# Patient Record
Sex: MALE | Race: WHITE | NOT HISPANIC OR LATINO
[De-identification: names, ages, dates, MRNs, and addresses within clinical notes are randomized per-mention and may not be internally consistent; named-entity substitution may affect disease eponyms.]

---

## 2022-06-14 ENCOUNTER — APPOINTMENT (OUTPATIENT)
Dept: ORTHOPEDIC SURGERY | Facility: CLINIC | Age: 57
End: 2022-06-14
Payer: COMMERCIAL

## 2022-06-14 ENCOUNTER — RESULT CHARGE (OUTPATIENT)
Age: 57
End: 2022-06-14

## 2022-06-14 PROCEDURE — 99204 OFFICE O/P NEW MOD 45 MIN: CPT | Mod: 25

## 2022-06-14 PROCEDURE — 73562 X-RAY EXAM OF KNEE 3: CPT | Mod: RT

## 2022-06-14 PROCEDURE — 73100 X-RAY EXAM OF WRIST: CPT | Mod: LT

## 2022-06-14 PROCEDURE — 20600 DRAIN/INJ JOINT/BURSA W/O US: CPT | Mod: LT

## 2022-06-17 ENCOUNTER — APPOINTMENT (OUTPATIENT)
Dept: ORTHOPEDIC SURGERY | Facility: CLINIC | Age: 57
End: 2022-06-17
Payer: COMMERCIAL

## 2022-06-17 PROCEDURE — 73110 X-RAY EXAM OF WRIST: CPT | Mod: RT

## 2022-06-17 PROCEDURE — 99213 OFFICE O/P EST LOW 20 MIN: CPT

## 2022-06-17 NOTE — PHYSICAL EXAM
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

## 2022-06-17 NOTE — HISTORY OF PRESENT ILLNESS
[de-identified] : 57 year old male presents with RIGHT wrist pain for the past 6 months which has worsened over the past 2 months. No injury/trauma.

## 2022-06-20 ENCOUNTER — NON-APPOINTMENT (OUTPATIENT)
Age: 57
End: 2022-06-20

## 2022-06-20 DIAGNOSIS — M23.41 LOOSE BODY IN KNEE, RIGHT KNEE: ICD-10-CM

## 2022-06-20 DIAGNOSIS — M25.531 PAIN IN RIGHT WRIST: ICD-10-CM

## 2022-06-20 DIAGNOSIS — S83.241S OTHER TEAR OF MEDIAL MENISCUS, CURRENT INJURY, RIGHT KNEE, SEQUELA: ICD-10-CM

## 2022-06-20 NOTE — PROCEDURE
[De Arabella's] : bailey nguyen's [1st] : 1st trigger finger [___ cc    1%] : Lidocaine ~Vcc of 1%  [___ cc    40mg] : Methylprednisolone (Depomedrol) ~Vcc of 40 mg

## 2022-06-20 NOTE — HISTORY OF PRESENT ILLNESS
[Gradual] : gradual [5] : 5 [Dull/Aching] : dull/aching [Intermittent] : intermittent [Nothing helps with pain getting better] : Nothing helps with pain getting better [de-identified] : 06/14/2022: LT wrist & RT KNEE\par Pt reports LT forearm injury 1983 involving distal radius treated with ORIF utilizing a plate. I performed the hardware removal back in 1985. He returned to performing heavy work & only recently began to experience strong pain over the distal aspect of the radius and hand, related to firm grasping.\par Pt report that he underwent a minisectomy of the RT knee by Dr. Torres and did well until recently. Pt now C/O mostley medial knee pain with a sense of catching & episodes of Art giving way.\par \par ******1st dorsal tendon sheath cortisone inj***************** [] : no [FreeTextEntry1] : LT Wrist, RT Knee

## 2022-06-20 NOTE — HISTORY OF PRESENT ILLNESS
[Gradual] : gradual [5] : 5 [Dull/Aching] : dull/aching [Intermittent] : intermittent [Nothing helps with pain getting better] : Nothing helps with pain getting better [de-identified] : 06/14/2022: LT wrist & RT KNEE\par Pt reports LT forearm injury 1983 involving distal radius treated with ORIF utilizing a plate. I performed the hardware removal back in 1985. He returned to performing heavy work & only recently began to experience strong pain over the distal aspect of the radius and hand, related to firm grasping.\par Pt report that he underwent a minisectomy of the RT knee by Dr. Torres and did well until recently. Pt now C/O mostley medial knee pain with a sense of catching & episodes of Art giving way.\par \par ******1st dorsal tendon sheath cortisone inj***************** [] : no [FreeTextEntry1] : LT Wrist, RT Knee

## 2022-06-20 NOTE — PHYSICAL EXAM
[Left] : left hand [Right] : right knee [FreeTextEntry3] : RT Knee: There is a small effusion. ROM is from 1 degrees flexion contracture to 120 degrees flexion. No laxity. Medial JT line tenderness. There is posterior medial JT line tenderness. Pt is tender along the course of the ITB all the way down to Gerdys tubercle.

## 2022-06-21 NOTE — HISTORY OF PRESENT ILLNESS
[Gradual] : gradual [5] : 5 [Dull/Aching] : dull/aching [Intermittent] : intermittent [Nothing helps with pain getting better] : Nothing helps with pain getting better [de-identified] : 06/14/2022: LT wrist & RT KNEE\par Pt reports LT forearm injury 1983 involving distal radius treated with ORIF utilizing a plate. I performed the hardware removal back in 1985. He returned to performing heavy work & only recently began to experience strong pain over the distal aspect of the radius and hand, related to firm grasping.\par Pt report that he underwent a minisectomy of the RT knee by Dr. Torres and did well until recently. Pt now C/O mostley medial knee pain with a sense of catching & episodes of Art giving way.\par \par ******1st dorsal tendon sheath cortisone inj***************** [] : no [FreeTextEntry1] : LT Wrist, RT Knee

## 2022-06-21 NOTE — HISTORY OF PRESENT ILLNESS
[Gradual] : gradual [5] : 5 [Dull/Aching] : dull/aching [Intermittent] : intermittent [Nothing helps with pain getting better] : Nothing helps with pain getting better [de-identified] : 06/14/2022: LT wrist & RT KNEE\par Pt reports LT forearm injury 1983 involving distal radius treated with ORIF utilizing a plate. I performed the hardware removal back in 1985. He returned to performing heavy work & only recently began to experience strong pain over the distal aspect of the radius and hand, related to firm grasping.\par Pt report that he underwent a minisectomy of the RT knee by Dr. Torres and did well until recently. Pt now C/O mostley medial knee pain with a sense of catching & episodes of Art giving way.\par \par ******1st dorsal tendon sheath cortisone inj***************** [] : no [FreeTextEntry1] : LT Wrist, RT Knee

## 2022-06-29 ENCOUNTER — APPOINTMENT (OUTPATIENT)
Dept: ORTHOPEDIC SURGERY | Facility: CLINIC | Age: 57
End: 2022-06-29
Payer: COMMERCIAL

## 2022-06-29 VITALS — BODY MASS INDEX: 20.54 KG/M2 | WEIGHT: 155 LBS | HEIGHT: 73 IN

## 2022-06-29 PROCEDURE — 99213 OFFICE O/P EST LOW 20 MIN: CPT

## 2022-06-29 RX ADMIN — Medication 0 MG/2ML: at 00:00

## 2022-06-29 NOTE — HISTORY OF PRESENT ILLNESS
[Gradual] : gradual [Dull/Aching] : dull/aching [Intermittent] : intermittent [Nothing helps with pain getting better] : Nothing helps with pain getting better [7] : 7 [4] : 4 [de-identified] : 06/14/2022: LT wrist & RT KNEE\par Pt reports LT forearm injury 1983 involving distal radius treated with ORIF utilizing a plate. I performed the hardware removal back in 1985. He returned to performing heavy work & only recently began to experience strong pain over the distal aspect of the radius and hand, related to firm grasping.\par Pt report that he underwent a minisectomy of the RT knee by Dr. Torres and did well until recently. Pt now C/O mostley medial knee pain with a sense of catching & episodes of Art giving way.\par \par ******1st dorsal tendon sheath cortisone inj*****************\par \par 06/29/2022: LT Wrist & RT Knee\par Pt reports Rt knee pain interferes with most activity and is worse is stair climbing or lifting something on floor level. He recently rode a bicycle and soon developed knee pain. [] : no [FreeTextEntry1] : RT Knee, right foot [FreeTextEntry5] : pt states that he injured his right foot when he was a little kid and is causing injury on his right knee  [de-identified] : motion

## 2022-06-30 RX ORDER — HYALURONATE SODIUM 20 MG/2 ML
20 SYRINGE (ML) INTRAARTICULAR
Qty: 1 | Refills: 0 | Status: ACTIVE | COMMUNITY
Start: 2022-06-30 | End: 1900-01-01

## 2022-07-06 ENCOUNTER — APPOINTMENT (OUTPATIENT)
Dept: ORTHOPEDIC SURGERY | Facility: CLINIC | Age: 57
End: 2022-07-06

## 2022-07-06 VITALS — HEIGHT: 73 IN | BODY MASS INDEX: 20.54 KG/M2 | WEIGHT: 155 LBS

## 2022-07-06 DIAGNOSIS — F17.210 NICOTINE DEPENDENCE, CIGARETTES, UNCOMPLICATED: ICD-10-CM

## 2022-07-06 DIAGNOSIS — M20.11 HALLUX VALGUS (ACQUIRED), RIGHT FOOT: ICD-10-CM

## 2022-07-06 DIAGNOSIS — Z78.9 OTHER SPECIFIED HEALTH STATUS: ICD-10-CM

## 2022-07-06 PROCEDURE — 99214 OFFICE O/P EST MOD 30 MIN: CPT

## 2022-07-06 PROCEDURE — 73630 X-RAY EXAM OF FOOT: CPT | Mod: RT

## 2022-07-06 NOTE — PHYSICAL EXAM
[Right] : right foot and ankle [1st] : 1st [5___] : [facility] 5[unfilled]/5 [2+] : dorsalis pedis pulse: 2+ [] : no mid foot instability [de-identified] : MTP joint DF 20 degrees [TWNoteComboBox6] : MTP joint PF 10 degrees

## 2022-07-06 NOTE — HISTORY OF PRESENT ILLNESS
[7] : 7 [2] : 2 [Burning] : burning [Sharp] : sharp [Retired] : Work status: retired [de-identified] : 7/6/22: 1 year great toe pain. h/o fx as child. no surgery. no recent injury/treatment. no md. 1ppd. retired.   [] : Post Surgical Visit: no [FreeTextEntry1] : RT foot/big toe

## 2022-07-06 NOTE — ASSESSMENT
[FreeTextEntry1] : toe spacer\par voltaren\par not a surgical candidate at this time due to smoking\par patient moving to [place]\par f/up prn\par \par The patient's current medication management of their orthopedic diagnosis was reviewed today:\par \par (1) We discussed a comprehensive treatment plan that included possible pharmaceutical management involving the use of prescription strength medications including but not limited to options such as oral Naprosyn 500mg BID, once daily Meloxicam 15 mg, or 500-650 mg Tylenol versus over the counter oral medications and topical prescription NSAID Pennsaid vs over the counter Voltaren gel.\par (2) There is a moderate risk of morbidity with further treatment, especially from use of prescription strength medications and possible side effects of these medications which include upset stomach with oral medications, skin reactions to topical medications and cardiac/renal issues with long term use.\par (3) I recommended that the patient follow-up with their medical physician to discuss any significant specific potential issues with long term medication use such as interactions with current medications or with exacerbation of underlying medical comorbidities.\par (4) The benefits and risks associated with use of injectable, oral or topical, prescription and over the counter anti-inflammatory medications were discussed with the patient. The patient voiced understanding of the risks including but not limited to bleeding, stroke, kidney dysfunction, heart disease, and were referred to the black box warning label for further information.\par \par

## 2022-07-15 ENCOUNTER — APPOINTMENT (OUTPATIENT)
Dept: ORTHOPEDIC SURGERY | Facility: CLINIC | Age: 57
End: 2022-07-15

## 2022-07-15 VITALS — WEIGHT: 155 LBS | BODY MASS INDEX: 20.54 KG/M2 | HEIGHT: 73 IN

## 2022-07-15 PROCEDURE — 20605 DRAIN/INJ JOINT/BURSA W/O US: CPT

## 2022-07-15 PROCEDURE — 99213 OFFICE O/P EST LOW 20 MIN: CPT | Mod: 25

## 2022-07-15 NOTE — PHYSICAL EXAM
[Right] : right hand [Dorsal Wrist] : dorsal wrist [] : no laceration/abrasion [de-identified] : PT tenderness

## 2022-07-15 NOTE — PROCEDURE
[Medium Joint Injection] : medium joint injection [Right] : of the right [Wrist] : wrist [Pain] : pain [Inflammation] : inflammation [Alcohol] : alcohol [Ethyl Chloride sprayed topically] : ethyl chloride sprayed topically [Sterile technique used] : sterile technique used [___ cc    1%] : Lidocaine ~Vcc of 1%  [___ cc    10mg] : Triamcinolone (Kenalog) ~Vcc of 10 mg

## 2022-07-15 NOTE — HISTORY OF PRESENT ILLNESS
[Tingling] : tingling [de-identified] : 57 year old male being followed for RIGHT  Arthritis, wrist (radio carpal). Has been managing conservatively. Reports no change in his symptoms. Comaplinig of ulnar sided wrist pain.  [] : no [FreeTextEntry1] : b/l wrists

## 2022-07-19 ENCOUNTER — APPOINTMENT (OUTPATIENT)
Dept: ORTHOPEDIC SURGERY | Facility: CLINIC | Age: 57
End: 2022-07-19

## 2022-07-19 DIAGNOSIS — M19.131 POST-TRAUMATIC OSTEOARTHRITIS, RIGHT WRIST: ICD-10-CM

## 2022-07-19 DIAGNOSIS — M17.11 UNILATERAL PRIMARY OSTEOARTHRITIS, RIGHT KNEE: ICD-10-CM

## 2022-07-19 PROCEDURE — 99212 OFFICE O/P EST SF 10 MIN: CPT | Mod: 25

## 2022-07-19 PROCEDURE — 20610 DRAIN/INJ JOINT/BURSA W/O US: CPT | Mod: RT

## 2022-07-19 NOTE — PROCEDURE
[Large Joint Injection] : Large joint injection [Right] : of the right [Knee] : knee [Euflexxa] : Euflexxa [#1] : series #1

## 2022-07-19 NOTE — REVIEW OF SYSTEMS
Jose Leo is a 59 y.o. male who presents for Finger Injury (L hand, mid finger;x1hr )       Patient is a 5-year-old male who presents today after injuring his left hand doing mixed martial arts.  Patient states he got hit in his left middle finger.  Since that time he states he has not been able to extend his distal phalanx.  Patient denies any other injuries.        PMH: No significant  MEDS: No current outpatient prescriptions on file.  ALLERGIES: No Known Allergies  SURGHX: History reviewed. No pertinent surgical history.  SOCHX:  reports that he has never smoked. He has never used smokeless tobacco. He reports that he drinks alcohol. He reports that he does not use drugs.  FH: Family history was reviewed, no pertinent findings to report    Review of Systems   Constitutional: Negative.    HENT: Negative.    Musculoskeletal: Positive for joint pain.   Skin: Negative.    All other systems reviewed and are negative.    No Known Allergies   Objective:   /78   Pulse 72   Temp 36.4 °C (97.5 °F)   Resp 18   Ht 1.829 m (6')   Wt 86.2 kg (190 lb)   SpO2 97%   BMI 25.77 kg/m²   Physical Exam   Constitutional: He is oriented to person, place, and time. He appears well-developed and well-nourished. He is active. No distress.   HENT:   Head: Normocephalic and atraumatic.   Mouth/Throat: Oropharynx is clear and moist. No oropharyngeal exudate.   Eyes: Conjunctivae are normal. Pupils are equal, round, and reactive to light. Right eye exhibits no discharge. Left eye exhibits no discharge. No scleral icterus.   Neck: Carotid bruit is not present. No thyroid mass and no thyromegaly present.   Cardiovascular: S1 normal and S2 normal.    Pulmonary/Chest: Effort normal. No respiratory distress.   Abdominal: There is no hepatosplenomegaly.   Musculoskeletal: He exhibits no edema.        Cervical back: Normal.        Left hand: He exhibits decreased range of motion. Normal sensation noted. Decreased strength noted.         Hands:  Lymphadenopathy:        Head (right side): No submental, no submandibular and no occipital adenopathy present.        Head (left side): No submental, no submandibular and no occipital adenopathy present.   Neurological: He is alert and oriented to person, place, and time. He has normal strength. No cranial nerve deficit.   Skin: Skin is warm and dry. No rash noted. No erythema.   Psychiatric: He has a normal mood and affect. His behavior is normal. Judgment and thought content normal.         Assessment/Plan:   Assessment    1. Mallet finger of left hand  - DX-FINGER(S) 2+ LEFT; no fracture noted  Differential diagnosis, natural history, supportive care, and indications for immediate follow-up discussed.  Patient's hands placed in a splint.  Patient also given a referral to orthopedics.       [Joint Pain] : joint pain [Negative] : Heme/Lymph

## 2022-07-26 ENCOUNTER — APPOINTMENT (OUTPATIENT)
Dept: ORTHOPEDIC SURGERY | Facility: CLINIC | Age: 57
End: 2022-07-26

## 2022-07-26 PROCEDURE — 99212 OFFICE O/P EST SF 10 MIN: CPT | Mod: 25

## 2022-07-26 PROCEDURE — 20610 DRAIN/INJ JOINT/BURSA W/O US: CPT | Mod: RT

## 2022-07-26 NOTE — HISTORY OF PRESENT ILLNESS
[Gradual] : gradual [7] : 7 [4] : 4 [Dull/Aching] : dull/aching [Intermittent] : intermittent [Nothing helps with pain getting better] : Nothing helps with pain getting better [de-identified] : 06/14/2022: LT wrist & RT KNEE\par Pt reports LT forearm injury 1983 involving distal radius treated with ORIF utilizing a plate. I performed the hardware removal back in 1985. He returned to performing heavy work & only recently began to experience strong pain over the distal aspect of the radius and hand, related to firm grasping.\par Pt report that he underwent a minisectomy of the RT knee by Dr. Torres and did well until recently. Pt now C/O mostley medial knee pain with a sense of catching & episodes of Art giving way.\par \par ******1st dorsal tendon sheath cortisone inj*****************\par \par 06/29/2022: LT Wrist & RT Knee\par Pt reports Rt knee pain interferes with most activity and is worse is stair climbing or lifting something on floor level. He recently rode a bicycle and soon developed knee pain.\par \par 0719/2022: RT knee Euflexxa inj #1\par \par 07/26/2022: RT knee Euflexxa inj #2 [] : no [FreeTextEntry1] : RT Knee, right foot [FreeTextEntry5] : pt states that he injured his right foot when he was a little kid and is causing injury on his right knee  [de-identified] : motion

## 2022-07-26 NOTE — PROCEDURE
[Large Joint Injection] : Large joint injection [Right] : of the right [Knee] : knee [Euflexxa] : Euflexxa [#2] : series #2

## 2022-08-02 ENCOUNTER — APPOINTMENT (OUTPATIENT)
Dept: ORTHOPEDIC SURGERY | Facility: CLINIC | Age: 57
End: 2022-08-02

## 2022-08-02 PROCEDURE — 99212 OFFICE O/P EST SF 10 MIN: CPT | Mod: 25

## 2022-08-02 PROCEDURE — 20610 DRAIN/INJ JOINT/BURSA W/O US: CPT

## 2022-08-02 NOTE — PROCEDURE
[Large Joint Injection] : Large joint injection [Right] : of the right [Knee] : knee [Euflexxa] : Euflexxa [#3] : series #3

## 2022-08-02 NOTE — HISTORY OF PRESENT ILLNESS
[Gradual] : gradual [7] : 7 [4] : 4 [Dull/Aching] : dull/aching [Intermittent] : intermittent [Nothing helps with pain getting better] : Nothing helps with pain getting better [de-identified] : 06/14/2022: LT wrist & RT KNEE\par Pt reports LT forearm injury 1983 involving distal radius treated with ORIF utilizing a plate. I performed the hardware removal back in 1985. He returned to performing heavy work & only recently began to experience strong pain over the distal aspect of the radius and hand, related to firm grasping.\par Pt report that he underwent a minisectomy of the RT knee by Dr. Torres and did well until recently. Pt now C/O mostley medial knee pain with a sense of catching & episodes of Art giving way.\par \par ******1st dorsal tendon sheath cortisone inj*****************\par \par 06/29/2022: LT Wrist & RT Knee\par Pt reports Rt knee pain interferes with most activity and is worse is stair climbing or lifting something on floor level. He recently rode a bicycle and soon developed knee pain.\par \par 0719/2022: RT knee Euflexxa inj #1\par \par 07/26/2022: RT knee Euflexxa inj #2\par \par 08/02/2022: RT Knee Euflexxa inj #3 [] : no [FreeTextEntry1] : RT Knee, right foot [FreeTextEntry5] : pt states that he injured his right foot when he was a little kid and is causing injury on his right knee  [de-identified] : motion

## 2022-08-12 ENCOUNTER — APPOINTMENT (OUTPATIENT)
Dept: ORTHOPEDIC SURGERY | Facility: CLINIC | Age: 57
End: 2022-08-12

## 2022-08-12 VITALS — BODY MASS INDEX: 20.54 KG/M2 | HEIGHT: 73 IN | WEIGHT: 155 LBS

## 2022-08-12 DIAGNOSIS — M19.039 PRIMARY OSTEOARTHRITIS, UNSPECIFIED WRIST: ICD-10-CM

## 2022-08-12 PROCEDURE — 99213 OFFICE O/P EST LOW 20 MIN: CPT

## 2022-08-12 NOTE — HISTORY OF PRESENT ILLNESS
[de-identified] : 57 year old male being followed for RIGHT Arthritis, wrist (radio carpal). 4 weeks s/p CSI with little to no improvement.  [FreeTextEntry1] : b/l wrists  [FreeTextEntry5] : patient feels that he didn’t get much relief from the cortisone injs. The left wrist feels the same and the right wrist feels worse than before

## 2022-09-01 ENCOUNTER — APPOINTMENT (OUTPATIENT)
Dept: ORTHOPEDIC SURGERY | Facility: CLINIC | Age: 57
End: 2022-09-01

## 2022-09-22 PROBLEM — M17.11 PRIMARY OSTEOARTHRITIS OF RIGHT KNEE: Status: ACTIVE | Noted: 2022-06-14

## 2022-09-22 PROBLEM — M19.131 SCAPHOLUNATE ADVANCED COLLAPSE OF RIGHT WRIST: Status: ACTIVE | Noted: 2022-07-15

## 2022-09-22 NOTE — HISTORY OF PRESENT ILLNESS
[Gradual] : gradual [7] : 7 [4] : 4 [Dull/Aching] : dull/aching [Intermittent] : intermittent [Nothing helps with pain getting better] : Nothing helps with pain getting better [de-identified] : 06/14/2022: LT wrist & RT KNEE\par Pt reports LT forearm injury 1983 involving distal radius treated with ORIF utilizing a plate. I performed the hardware removal back in 1985. He returned to performing heavy work & only recently began to experience strong pain over the distal aspect of the radius and hand, related to firm grasping.\par Pt report that he underwent a minisectomy of the RT knee by Dr. Torres and did well until recently. Pt now C/O mostley medial knee pain with a sense of catching & episodes of Art giving way.\par \par ******1st dorsal tendon sheath cortisone inj*****************\par \par 06/29/2022: LT Wrist & RT Knee\par Pt reports Rt knee pain interferes with most activity and is worse is stair climbing or lifting something on floor level. He recently rode a bicycle and soon developed knee pain. [] : no [FreeTextEntry1] : RT Knee, right foot [FreeTextEntry5] : pt states that he injured his right foot when he was a little kid and is causing injury on his right knee  [de-identified] : motion

## 2022-09-23 ENCOUNTER — APPOINTMENT (OUTPATIENT)
Dept: ORTHOPEDIC SURGERY | Facility: CLINIC | Age: 57
End: 2022-09-23